# Patient Record
Sex: MALE | Race: WHITE | NOT HISPANIC OR LATINO | Employment: FULL TIME | URBAN - METROPOLITAN AREA
[De-identification: names, ages, dates, MRNs, and addresses within clinical notes are randomized per-mention and may not be internally consistent; named-entity substitution may affect disease eponyms.]

---

## 2019-07-03 ENCOUNTER — HOSPITAL ENCOUNTER (OUTPATIENT)
Dept: OTHER | Facility: HOSPITAL | Age: 54
Discharge: HOME | End: 2019-07-03
Attending: PSYCHIATRY & NEUROLOGY

## 2019-09-10 ENCOUNTER — HOSPITAL ENCOUNTER (OUTPATIENT)
Dept: OTHER | Facility: HOSPITAL | Age: 54
Discharge: HOME | End: 2019-09-10
Attending: PSYCHIATRY & NEUROLOGY

## 2019-09-10 ENCOUNTER — OFFICE VISIT CONVERTED (OUTPATIENT)
Dept: NEUROLOGY | Facility: CLINIC | Age: 54
End: 2019-09-10
Attending: PSYCHIATRY & NEUROLOGY

## 2019-09-10 LAB
ALBUMIN SERPL-MCNC: 4.2 G/DL (ref 3.5–5)
ALBUMIN/GLOB SERPL: 1.4 {RATIO} (ref 1.4–2.6)
ALP SERPL-CCNC: 60 U/L (ref 56–119)
ALT SERPL-CCNC: 7 U/L (ref 10–40)
ANION GAP SERPL CALC-SCNC: 13 MMOL/L (ref 8–19)
AST SERPL-CCNC: 27 U/L (ref 15–50)
BASOPHILS # BLD MANUAL: 0.03 10*3/UL (ref 0–0.2)
BASOPHILS NFR BLD MANUAL: 0.4 % (ref 0–3)
BILIRUB SERPL-MCNC: 0.58 MG/DL (ref 0.2–1.3)
BUN SERPL-MCNC: 14 MG/DL (ref 5–25)
BUN/CREAT SERPL: 12 {RATIO} (ref 6–20)
CALCIUM SERPL-MCNC: 9.2 MG/DL (ref 8.7–10.4)
CHLORIDE SERPL-SCNC: 103 MMOL/L (ref 99–111)
CONV CO2: 27 MMOL/L (ref 22–32)
CONV TOTAL PROTEIN: 7.2 G/DL (ref 6.3–8.2)
CREAT UR-MCNC: 1.19 MG/DL (ref 0.7–1.2)
DEPRECATED RDW RBC AUTO: 37.5 FL
EOSINOPHIL # BLD MANUAL: 0.31 10*3/UL (ref 0–0.7)
EOSINOPHIL NFR BLD MANUAL: 4.6 % (ref 0–7)
ERYTHROCYTE [DISTWIDTH] IN BLOOD BY AUTOMATED COUNT: 11.7 % (ref 11.5–14.5)
GFR SERPLBLD BASED ON 1.73 SQ M-ARVRAT: >60 ML/MIN/{1.73_M2}
GLOBULIN UR ELPH-MCNC: 3 G/DL (ref 2–3.5)
GLUCOSE SERPL-MCNC: 107 MG/DL (ref 70–99)
GRANS (ABSOLUTE): 4.18 10*3/UL (ref 2–8)
GRANS: 62.4 % (ref 30–85)
HBA1C MFR BLD: 16.3 G/DL (ref 14–18)
HCT VFR BLD AUTO: 48.9 % (ref 42–52)
IMM GRANULOCYTES # BLD: 0.01 10*3/UL (ref 0–0.54)
IMM GRANULOCYTES NFR BLD: 0.1 % (ref 0–0.43)
LYMPHOCYTES # BLD MANUAL: 1.65 10*3/UL (ref 1–5)
LYMPHOCYTES NFR BLD MANUAL: 7.9 % (ref 3–10)
MCH RBC QN AUTO: 29 PG (ref 27–31)
MCHC RBC AUTO-ENTMCNC: 33.3 G/DL (ref 33–37)
MCV RBC AUTO: 86.9 FL (ref 80–96)
MONOCYTES # BLD AUTO: 0.53 10*3/UL (ref 0.2–1.2)
OSMOLALITY SERPL CALC.SUM OF ELEC: 289 MOSM/KG (ref 273–304)
PLATELET # BLD AUTO: 207 10*3/UL (ref 130–400)
PMV BLD AUTO: 8.8 FL (ref 7.4–10.4)
POTASSIUM SERPL-SCNC: 4.3 MMOL/L (ref 3.5–5.3)
RBC # BLD AUTO: 5.63 10*6/UL (ref 4.7–6.1)
SODIUM SERPL-SCNC: 139 MMOL/L (ref 135–147)
VARIANT LYMPHS NFR BLD MANUAL: 24.6 % (ref 20–45)
WBC # BLD AUTO: 6.71 10*3/UL (ref 4.8–10.8)

## 2020-03-17 ENCOUNTER — OFFICE VISIT CONVERTED (OUTPATIENT)
Dept: NEUROLOGY | Facility: CLINIC | Age: 55
End: 2020-03-17
Attending: PSYCHIATRY & NEUROLOGY

## 2020-03-17 ENCOUNTER — CONVERSION ENCOUNTER (OUTPATIENT)
Dept: NEUROLOGY | Facility: CLINIC | Age: 55
End: 2020-03-17

## 2020-06-11 ENCOUNTER — OFFICE VISIT CONVERTED (OUTPATIENT)
Dept: NEUROLOGY | Facility: CLINIC | Age: 55
End: 2020-06-11
Attending: NURSE PRACTITIONER

## 2020-09-22 ENCOUNTER — OFFICE VISIT CONVERTED (OUTPATIENT)
Dept: NEUROLOGY | Facility: CLINIC | Age: 55
End: 2020-09-22
Attending: NURSE PRACTITIONER

## 2021-01-26 ENCOUNTER — OFFICE VISIT CONVERTED (OUTPATIENT)
Dept: NEUROLOGY | Facility: CLINIC | Age: 56
End: 2021-01-26
Attending: NURSE PRACTITIONER

## 2021-05-13 NOTE — PROGRESS NOTES
Progress Note      Patient Name: Lenny Sanders   Patient ID: [id number]   Sex: Male   YOB: 1965    Primary Care Provider: Braulio Ricks MD   Referring Provider: Braulio Ricks MD    Visit Date: June 11, 2020    Provider: SHUKRI Aparicio   Location: [facility]   Location Address: [address] [place] [address] [place], [place]  [id number]   Location Phone: [phone]          Chief Complaint  · Follow Up Exam      History Of Present Illness  Lenny Sanders is a 54 year old /White male who presents today to [facility] today for a follow up exam. Denies falls. Endorses tremor to left hand. Endorses mild constipation. Moods are good. Sleeping fairly well. Denies difficulty swallowing or getting choked. Denies hallucinations. Endorses occasional vivid dreams.       Past Medical History  Parkinson's Disease         Past Surgical History  Hernia         Medication List  carbidopa-levodopa  mg oral tablet; celecoxib 200 mg oral capsule; ibuprofen 200 mg oral capsule; pramipexole 0.25 mg oral tablet         Allergy List  NO KNOWN DRUG ALLERGIES       Allergies Reconciled  Social History  Alcohol (Never); Denies substance abuse (Never); Tobacco (Never)         Review of Systems  · Constitutional  o Denies  o : chills, excessive sweating, fatigue, fever, sycope/passing out, weight gain, weight loss  · Eyes  o Denies  o : changes in vision, blurry vision, double vision  · HENT  o Denies  o : loss of hearing, ringing in the ears, ear aches, sore throat, nasal congestion, sinus pain, nose bleeds, seasonal allergies  · Cardiovascular  o Denies  o : blood clots, swollen legs, anemia, easy burising or bleeding, transfusions  · Respiratory  o Denies  o : shortness of breath, dry cough, productive cough, pneumonia, COPD  · Gastrointestinal  o Denies  o : difficulty swallowing, reflux  · Genitourinary  o Denies  o :  "incontinence  · Neurologic  o Admits  o : tremor  o Denies  o : headache, seizure, stroke, loss of balance, falls, dizziness/vertigo, difficulty with sleep, numbness/tingling/paresthesia , difficulty with coordination, difficulty with dexterity, weakness  · Musculoskeletal  o Denies  o : neck stiffness/pain, swollen lymph nodes, muscle aches, joint pain, weakness, spasms, sciatica, pain radiating in arm, pain radiating in leg, low back pain  · Endocrine  o Denies  o : diabetes, thyroid disorder  · Psychiatric  o Denies  o : anxiety, depression      Vitals  Date Time BP Position Site L\R Cuff Size HR RR TEMP (F) WT  HT  BMI kg/m2 BSA m2 O2 Sat HC       06/11/2020 01:15 /86 Sitting    86 - R  97.8 241lbs 0oz 5'  10\" 34.58 2.32           Physical Examination  · Constitutional  o Appearance  o : well-nourished, well groomed, in no apparent distress  · Eyes  o Pupils and Irises  o : Pupils equal, round, and reactive to light and accommodation bilaterally  · Respiratory  o Auscultation of Lungs  o : Lungs were clear to ascultation bilaterally. No wheezes, rhonchi or rales were appreciated.  · Cardiovascular  o Heart  o :   § Auscultation of Heart  § : Regular rate and rhythm, no murmurs, gallops or rubs were appreciated.  o Peripheral Vascular System  o :   § Extremities  § : No peripheral edema was appreciated  · Musculoskeletal  o General  o : Normal bulk and normal tone throughout. 5/5 motor strength throughout and symmetric. Normal gait and station.  · Neurologic  o Mental Status Examination  o :   § Orientation  § : Alert and oriented to person, place, and time,  § Speech/Language  § : Intact naming, comprehension, and repetition. No dysarthria.  § Memory  § : Immediate recall is 3/3. Recall at 5 minutes is 3/3.   § Attention  § : Attention span is intact to serial 7 examination and finger tapping.   § Fund of Knowledge  § : Adequate fund of knowledge  o Cranial Nerves  o : Pupils are equal, round and reactive " "to light. Extraocular movements are intact. Visual fields are full. Fundoscopic examination reveals sharp disc bilaterally. Sensation in the V1-V3 distribution is intact and symmetric. Muscles of mastication are strong and symmetric. Muscles of facial expression are strong and symmetric. Hearing is intact. Palatal raise is intact and symmetric. Uvula is midline. Shoulder shrug is strong. Tongue protrudes in the midline.  o Motor Examination  o :   § RUE Strength  § : strength normal  § LUE Strength  § : strength normal  § RLE Strength  § : strength normal  § LLE Strength  § : strength normal  o Reflexes  o : 2+ reflexes throughout and symmetric. Negative Boyer. Negative Babinski.   o Sensation  o : Intact sensation to light touch, pinprick, vibration and proprioception throughout.  o Gait and Station  o :   § Gait Screening  § : Normal, narrow based gait, with a normal arm swing.  o Cerebellar Function  o : intact finger to nose and heel to shin. Rapid alternating movements are intact in the upper and lower extremities.   o Coordination  o : Intact finger to nose and heel to shin. Rapid alternating movements are intact in the upper and lower extremities.     Moderate left upper extremity tremor.  Mild bradykinesia to left upper extremity.  Moderate bradykinesia to LLE.  Mild rigidity.           Assessment  · Parkinson's Disease     332.0/G20  Appears \"off\" from a PD standpoint today. He feels he had better control of symptoms on higher doses of CD-LD. Pramipexole is causing drowsiness. Discussed that he can take pramipexole qHS only if needed. He will increase CD-LD to 1.5-2 tablets QID.     Problems Reconciled  Plan  · Medications  o Medications have been Reconciled  o Transition of Care or Provider Policy  · Instructions  o Call or Return if symptoms worsen or persist.   o Follow up in 3 months.             Electronically Signed by: SHUKRI Aparicio -Author on June 13, 2020 12:26:02 PM  "

## 2021-05-13 NOTE — PROGRESS NOTES
Progress Note      Patient Name: Lenny Sanders   Patient ID: [id number]   Sex: Male   YOB: 1965    Primary Care Provider: Braulio Ricks MD   Referring Provider: Braulio Ricsk MD    Visit Date: September 22, 2020    Provider: SHUKRI Aparicio   Location: [facility]   Location Address: [address] [place] [address] [place], [place]  [id number]   Location Phone: [phone]          Chief Complaint     3 month follow up.       History Of Present Illness  Lenny Sanders is a 54 year old /White male who presents today to [facility] today for a follow up exam. Denies falls. Endorses tremor to left hand. Endorses mild constipation. Moods are good. Sleeping fairly well. Denies difficulty swallowing or getting choked. Denies hallucinations. Endorses occasional vivid dreams. Is taking CD-LD 1.5 tablets QID as directed and feels this is working much better for him.       Past Medical History  Parkinson's Disease         Past Surgical History  Hernia         Medication List  carbidopa-levodopa  mg oral tablet; celecoxib 200 mg oral capsule; ibuprofen 200 mg oral capsule         Allergy List  NO KNOWN DRUG ALLERGIES         Social History  Alcohol (Never); Denies substance abuse (Never); Tobacco (Never)         Review of Systems  · Constitutional  o Denies  o : chills, excessive sweating, fatigue, fever, sycope/passing out, weight gain, weight loss  · Eyes  o Denies  o : changes in vision, blurry vision, double vision  · HENT  o Denies  o : loss of hearing, ringing in the ears, ear aches, sore throat, nasal congestion, sinus pain, nose bleeds, seasonal allergies  · Cardiovascular  o Denies  o : blood clots, swollen legs, anemia, easy burising or bleeding, transfusions  · Respiratory  o Denies  o : shortness of breath, dry cough, productive cough, pneumonia, COPD  · Gastrointestinal  o Denies  o : difficulty swallowing,  "reflux  · Genitourinary  o Denies  o : incontinence  · Neurologic  o Admits  o : tremor, numbness/tingling/paresthesia   o Denies  o : headache, seizure, stroke, loss of balance, falls, dizziness/vertigo, difficulty with sleep, difficulty with coordination, difficulty with dexterity, weakness  · Musculoskeletal  o Admits  o : muscle aches, joint pain  o Denies  o : neck stiffness/pain, swollen lymph nodes, weakness, spasms, sciatica, pain radiating in arm, pain radiating in leg, low back pain  · Endocrine  o Denies  o : diabetes, thyroid disorder  · Psychiatric  o Denies  o : anxiety, depression      Vitals  Date Time BP Position Site L\R Cuff Size HR RR TEMP (F) WT  HT  BMI kg/m2 BSA m2 O2 Sat HC       09/22/2020 03:37 /99 Sitting    86 - R  97.6 246lbs 0oz 5'  10\" 35.3 2.35           Physical Examination  · Constitutional  o Appearance  o : well-nourished, well groomed, in no apparent distress  · Eyes  o Pupils and Irises  o : Pupils equal, round, and reactive to light and accommodation bilaterally  · Respiratory  o Auscultation of Lungs  o : Lungs were clear to ascultation bilaterally. No wheezes, rhonchi or rales were appreciated.  · Cardiovascular  o Heart  o :   § Auscultation of Heart  § : Regular rate and rhythm, no murmurs, gallops or rubs were appreciated.  o Peripheral Vascular System  o :   § Extremities  § : No peripheral edema was appreciated  · Musculoskeletal  o General  o : Normal bulk and normal tone throughout. 5/5 motor strength throughout and symmetric. Normal gait and station.  · Neurologic  o Mental Status Examination  o :   § Orientation  § : Alert and oriented to person, place, and time,  § Speech/Language  § : Intact naming, comprehension, and repetition. No dysarthria.  § Memory  § : Immediate recall is 3/3. Recall at 5 minutes is 3/3.   § Attention  § : Attention span is intact to serial 7 examination and finger tapping.   § Fund of Knowledge  § : Adequate fund of " knowledge  o Cranial Nerves  o : Pupils are equal, round and reactive to light. Extraocular movements are intact. Visual fields are full. Fundoscopic examination reveals sharp disc bilaterally. Sensation in the V1-V3 distribution is intact and symmetric. Muscles of mastication are strong and symmetric. Muscles of facial expression are strong and symmetric. Hearing is intact. Palatal raise is intact and symmetric. Uvula is midline. Shoulder shrug is strong. Tongue protrudes in the midline.  o Motor Examination  o :   § RUE Strength  § : strength normal  § LUE Strength  § : strength normal  § RLE Strength  § : strength normal  § LLE Strength  § : strength normal  o Reflexes  o : 2+ reflexes throughout and symmetric. Negative Boyer. Negative Babinski.   o Sensation  o : Intact sensation to light touch, pinprick, vibration and proprioception throughout.  o Gait and Station  o :   § Gait Screening  § : Normal, narrow based gait, with a normal arm swing.  o Cerebellar Function  o : intact finger to nose and heel to shin. Rapid alternating movements are intact in the upper and lower extremities.   o Coordination  o : Intact finger to nose and heel to shin. Rapid alternating movements are intact in the upper and lower extremities.     Mild left upper extremity tremor.  Mild bradykinesia to left upper extremity.  Mild bradykinesia to LLE.  Mild rigidity.           Assessment  · Parkinson's Disease     332.0/G20  Looks good from a PD standpoint today. Will continue CD-LD 1.5 tablets QID.      Plan  · Medications  o carbidopa-levodopa  mg oral tablet   SI.5 tablets QID   DISP: (240) Tablet with 1 refills  Adjusted on 2020     o Medications have been Reconciled  o Transition of Care or Provider Policy  · Instructions  o f/u 4 months  · Disposition  o Call or Return if symptoms worsen or persist.            Electronically Signed by: Tsering Wade APRN -Author on 2020 03:46:29 PM

## 2021-05-14 VITALS
HEART RATE: 86 BPM | SYSTOLIC BLOOD PRESSURE: 150 MMHG | BODY MASS INDEX: 35.22 KG/M2 | WEIGHT: 246 LBS | TEMPERATURE: 97.6 F | HEIGHT: 70 IN | DIASTOLIC BLOOD PRESSURE: 99 MMHG

## 2021-05-14 VITALS
HEART RATE: 78 BPM | WEIGHT: 245 LBS | BODY MASS INDEX: 35.07 KG/M2 | DIASTOLIC BLOOD PRESSURE: 89 MMHG | HEIGHT: 70 IN | SYSTOLIC BLOOD PRESSURE: 142 MMHG

## 2021-05-14 NOTE — PROGRESS NOTES
Progress Note      Patient Name: Lenny Sanders   Patient ID: [id number]   Sex: Male   YOB: 1965    Primary Care Provider: Braulio Ricks MD   Referring Provider: Braulio Ricks MD    Visit Date: January 26, 2021    Provider: SHUKRI Aparicio   Location: [facility]   Location Address: [address] [place] [address] [place], [place]  [id number]   Location Phone: [phone]          Chief Complaint     Pt is here for a 3 month f/u       History Of Present Illness  Lenny Sanders is a 54 year old /White male who presents today to [facility] today for a follow up exam. Denies falls. Endorses tremor to left hand. Endorses mild constipation. Moods are good. Sleeping fairly well. Denies difficulty swallowing or getting choked. Denies hallucinations. Endorses occasional vivid dreams. Is taking CD-LD 1.5 tablets QID as directed and feels this is working much better for him.       Past Medical History  Parkinson's Disease         Past Surgical History  Hernia         Medication List  carbidopa-levodopa  mg oral tablet; celecoxib 200 mg oral capsule; ibuprofen 200 mg oral capsule         Allergy List  NO KNOWN DRUG ALLERGIES       Allergies Reconciled  Social History  Alcohol (Never); Denies substance abuse (Never); Tobacco (Never)         Review of Systems  · Constitutional  o Denies  o : chills, excessive sweating, fatigue, fever, sycope/passing out, weight gain, weight loss  · Eyes  o Denies  o : changes in vision, blurry vision, double vision  · HENT  o Denies  o : loss of hearing, ringing in the ears, ear aches, sore throat, nasal congestion, sinus pain, nose bleeds, seasonal allergies  · Cardiovascular  o Denies  o : blood clots, swollen legs, anemia, easy burising or bleeding, transfusions  · Respiratory  o Denies  o : shortness of breath, dry cough, productive cough, pneumonia, COPD  · Gastrointestinal  o Denies  o : difficulty swallowing,  "reflux  · Genitourinary  o Denies  o : incontinence  · Neurologic  o Admits  o : tremor, numbness/tingling/paresthesia   o Denies  o : headache, seizure, stroke, loss of balance, falls, dizziness/vertigo, difficulty with sleep, difficulty with coordination, difficulty with dexterity, weakness  · Musculoskeletal  o Admits  o : muscle aches, joint pain  o Denies  o : neck stiffness/pain, swollen lymph nodes, weakness, spasms, sciatica, pain radiating in arm, pain radiating in leg, low back pain  · Endocrine  o Denies  o : diabetes, thyroid disorder  · Psychiatric  o Denies  o : anxiety, depression      Vitals  Date Time BP Position Site L\R Cuff Size HR RR TEMP (F) WT  HT  BMI kg/m2 BSA m2 O2 Sat FR L/min FiO2 HC       01/26/2021 03:32 /89 Sitting    78 - R   245lbs 0oz 5'  10\" 35.15 2.34             Physical Examination  · Constitutional  o Appearance  o : well-nourished, well groomed, in no apparent distress  · Eyes  o Pupils and Irises  o : Pupils equal, round, and reactive to light and accommodation bilaterally  · Respiratory  o Auscultation of Lungs  o : Lungs were clear to ascultation bilaterally. No wheezes, rhonchi or rales were appreciated.  · Cardiovascular  o Heart  o :   § Auscultation of Heart  § : Regular rate and rhythm, no murmurs, gallops or rubs were appreciated.  o Peripheral Vascular System  o :   § Extremities  § : No peripheral edema was appreciated  · Musculoskeletal  o General  o : Normal bulk and normal tone throughout. 5/5 motor strength throughout and symmetric. Normal gait and station.  · Neurologic  o Mental Status Examination  o :   § Orientation  § : Alert and oriented to person, place, and time,  § Speech/Language  § : Intact naming, comprehension, and repetition. No dysarthria.  § Memory  § : Immediate recall is 3/3. Recall at 5 minutes is 3/3.   § Attention  § : Attention span is intact to serial 7 examination and finger tapping.   § Fund of Knowledge  § : Adequate fund of " knowledge  o Cranial Nerves  o : Pupils are equal, round and reactive to light. Extraocular movements are intact. Visual fields are full. Fundoscopic examination reveals sharp disc bilaterally. Sensation in the V1-V3 distribution is intact and symmetric. Muscles of mastication are strong and symmetric. Muscles of facial expression are strong and symmetric. Hearing is intact. Palatal raise is intact and symmetric. Uvula is midline. Shoulder shrug is strong. Tongue protrudes in the midline.  o Motor Examination  o :   § RUE Strength  § : strength normal  § LUE Strength  § : strength normal  § RLE Strength  § : strength normal  § LLE Strength  § : strength normal  o Reflexes  o : 2+ reflexes throughout and symmetric. Negative Boyer. Negative Babinski.   o Sensation  o : Intact sensation to light touch, pinprick, vibration and proprioception throughout.  o Gait and Station  o :   § Gait Screening  § : Normal, narrow based gait, with a normal arm swing.  o Cerebellar Function  o : intact finger to nose and heel to shin. Rapid alternating movements are intact in the upper and lower extremities.   o Coordination  o : Intact finger to nose and heel to shin. Rapid alternating movements are intact in the upper and lower extremities.     Mild left upper extremity tremor.  Mild bradykinesia to left upper extremity.  Mild bradykinesia to LLE.  Mild rigidity.           Assessment  · Parkinson's Disease     332.0/G20  Looks good from a PD standpoint today. Will continue CD-LD 1.5 tablets QID.      Plan  · Medications  o carbidopa-levodopa  mg oral tablet   SI.5 tablets QID   DISP: (240) Tablet with 1 refills  Refilled on 2021     o Medications have been Reconciled  o Transition of Care or Provider Policy  · Disposition  o Call or Return if symptoms worsen or persist.            Electronically Signed by: SHUKRI Aparicio -Author on 2021 08:07:56 PM

## 2021-05-15 VITALS
HEART RATE: 82 BPM | WEIGHT: 245 LBS | HEIGHT: 70 IN | SYSTOLIC BLOOD PRESSURE: 137 MMHG | BODY MASS INDEX: 35.07 KG/M2 | DIASTOLIC BLOOD PRESSURE: 75 MMHG

## 2021-05-15 VITALS
BODY MASS INDEX: 34.5 KG/M2 | HEIGHT: 70 IN | HEART RATE: 86 BPM | TEMPERATURE: 97.8 F | DIASTOLIC BLOOD PRESSURE: 86 MMHG | WEIGHT: 241 LBS | SYSTOLIC BLOOD PRESSURE: 135 MMHG

## 2021-05-15 VITALS — WEIGHT: 238 LBS | HEART RATE: 69 BPM | SYSTOLIC BLOOD PRESSURE: 121 MMHG | DIASTOLIC BLOOD PRESSURE: 75 MMHG

## 2021-06-01 ENCOUNTER — OFFICE VISIT CONVERTED (OUTPATIENT)
Dept: NEUROLOGY | Facility: CLINIC | Age: 56
End: 2021-06-01
Attending: NURSE PRACTITIONER

## 2021-06-06 NOTE — PROGRESS NOTES
Progress Note      Patient Name: Lenny Sanders   Patient ID: [id number]   Sex: Male   YOB: 1965    Primary Care Provider: Braulio Ricks MD   Referring Provider: Braulio Ricks MD    Visit Date: June 1, 2021    Provider: SHUKRI Aparicio   [facility]: [facility]   Location Address: [address] [place] [address] [place], [place]  [id number]   Location Phone: [phone]          Chief Complaint     3 month f/u parkinsons       History Of Present Illness  Lenny Sanders is a 54 year old /White male who presents today to [facility] today for a follow up exam. Denies falls. Endorses tremor to left hand. Endorses mild constipation. Moods are good. Sleeping fairly well. Denies difficulty swallowing or getting choked. Denies hallucinations. Endorses occasional vivid dreams. Is taking CD-LD 1.5 tablets QID as directed and feels this is working very well for him.       Past Medical History  Parkinson's Disease         Past Surgical History  Hernia         Medication List  carbidopa-levodopa  mg oral tablet; celecoxib 200 mg oral capsule; ibuprofen 200 mg oral capsule         Allergy List  NO KNOWN DRUG ALLERGIES         Social History  Alcohol (Never); Denies substance abuse (Never); Tobacco (Never)         Review of Systems  · Constitutional  o Denies  o : chills, excessive sweating, fatigue, fever, sycope/passing out, weight gain, weight loss  · Eyes  o Denies  o : changes in vision, blurry vision, double vision  · HENT  o Denies  o : loss of hearing, ringing in the ears, ear aches, sore throat, nasal congestion, sinus pain, nose bleeds, seasonal allergies  · Cardiovascular  o Denies  o : blood clots, swollen legs, anemia, easy burising or bleeding, transfusions  · Respiratory  o Denies  o : shortness of breath, dry cough, productive cough, pneumonia, COPD  · Gastrointestinal  o Denies  o : difficulty swallowing,  "reflux  · Genitourinary  o Denies  o : incontinence  · Neurologic  o Admits  o : tremor, numbness/tingling/paresthesia   o Denies  o : headache, seizure, stroke, loss of balance, falls, dizziness/vertigo, difficulty with sleep, difficulty with coordination, difficulty with dexterity, weakness  · Musculoskeletal  o Admits  o : muscle aches, joint pain  o Denies  o : neck stiffness/pain, swollen lymph nodes, weakness, spasms, sciatica, pain radiating in arm, pain radiating in leg, low back pain  · Endocrine  o Denies  o : diabetes, thyroid disorder  · Psychiatric  o Denies  o : anxiety, depression      Vitals  Date Time BP Position Site L\R Cuff Size HR RR TEMP (F) WT  HT  BMI kg/m2 BSA m2 O2 Sat FR L/min FiO2 HC       06/01/2021 02:43 /83 Sitting    81 - R   238lbs 0oz 5'  10\" 34.15 2.31             Physical Examination  · Constitutional  o Appearance  o : well-nourished, well groomed, in no apparent distress  · Eyes  o Pupils and Irises  o : Pupils equal, round, and reactive to light and accommodation bilaterally  · Respiratory  o Auscultation of Lungs  o : Lungs were clear to ascultation bilaterally. No wheezes, rhonchi or rales were appreciated.  · Cardiovascular  o Heart  o :   § Auscultation of Heart  § : Regular rate and rhythm, no murmurs, gallops or rubs were appreciated.  o Peripheral Vascular System  o :   § Extremities  § : No peripheral edema was appreciated  · Musculoskeletal  o General  o : Normal bulk and normal tone throughout. 5/5 motor strength throughout and symmetric. Normal gait and station.  · Neurologic  o Mental Status Examination  o :   § Orientation  § : Alert and oriented to person, place, and time,  § Speech/Language  § : Intact naming, comprehension, and repetition. No dysarthria.  § Memory  § : Immediate recall is 3/3. Recall at 5 minutes is 3/3.   § Attention  § : Attention span is intact to serial 7 examination and finger tapping.   § Fund of Knowledge  § : Adequate fund of " knowledge  o Cranial Nerves  o : Pupils are equal, round and reactive to light. Extraocular movements are intact. Visual fields are full. Fundoscopic examination reveals sharp disc bilaterally. Sensation in the V1-V3 distribution is intact and symmetric. Muscles of mastication are strong and symmetric. Muscles of facial expression are strong and symmetric. Hearing is intact. Palatal raise is intact and symmetric. Uvula is midline. Shoulder shrug is strong. Tongue protrudes in the midline.  o Motor Examination  o :   § RUE Strength  § : strength normal  § LUE Strength  § : strength normal  § RLE Strength  § : strength normal  § LLE Strength  § : strength normal  o Reflexes  o : 2+ reflexes throughout and symmetric. Negative Boyer. Negative Babinski.   o Sensation  o : Intact sensation to light touch, pinprick, vibration and proprioception throughout.  o Gait and Station  o :   § Gait Screening  § : Normal, narrow based gait, with a normal arm swing.  o Cerebellar Function  o : intact finger to nose and heel to shin. Rapid alternating movements are intact in the upper and lower extremities.   o Coordination  o : Intact finger to nose and heel to shin. Rapid alternating movements are intact in the upper and lower extremities.     Mild left upper extremity tremor.  Mild bradykinesia to left upper extremity.  Mild bradykinesia to LLE.  Mild rigidity.           Assessment  · Parkinson's Disease     332.0/G20  Looks good from a PD standpoint today. Will continue CD-LD 1.5 tablets QID.      Plan  · Medications  o carbidopa-levodopa  mg oral tablet   SI.5 tablets QID   DISP: (240) Tablet with 1 refills  Refilled on 2021     o Medications have been Reconciled  o Transition of Care or Provider Policy  · Instructions  o Encouraged to follow-up with Primary Care Provider for preventative care.  o f/u 6 months  · Disposition  o Call or Return if symptoms worsen or persist.            Electronically Signed by:  Tsering Wade, APRN -Author on June 1, 2021 02:57:28 PM

## 2021-07-15 VITALS
DIASTOLIC BLOOD PRESSURE: 83 MMHG | SYSTOLIC BLOOD PRESSURE: 147 MMHG | HEIGHT: 70 IN | BODY MASS INDEX: 34.07 KG/M2 | HEART RATE: 81 BPM | WEIGHT: 238 LBS

## 2021-12-14 ENCOUNTER — OFFICE VISIT (OUTPATIENT)
Dept: NEUROLOGY | Facility: CLINIC | Age: 56
End: 2021-12-14

## 2021-12-14 VITALS
HEIGHT: 70 IN | HEART RATE: 76 BPM | WEIGHT: 235.2 LBS | DIASTOLIC BLOOD PRESSURE: 81 MMHG | SYSTOLIC BLOOD PRESSURE: 147 MMHG | BODY MASS INDEX: 33.67 KG/M2

## 2021-12-14 DIAGNOSIS — G20 PARKINSON'S DISEASE (HCC): Primary | ICD-10-CM

## 2021-12-14 PROBLEM — G20.A1 PARKINSON'S DISEASE: Status: ACTIVE | Noted: 2021-12-14

## 2021-12-14 PROCEDURE — 99213 OFFICE O/P EST LOW 20 MIN: CPT | Performed by: NURSE PRACTITIONER

## 2021-12-14 RX ORDER — CELECOXIB 200 MG/1
1 CAPSULE ORAL DAILY
COMMUNITY
Start: 2021-11-11 | End: 2022-12-27

## 2021-12-14 NOTE — PATIENT INSTRUCTIONS
Parkinson's Disease  Parkinson's disease is a type of movement disorder. It is a long-term condition that gets worse over time (is progressive). Each person with Parkinson's disease is affected differently.  This condition limits your ability to control movements and move your body normally. The condition can range from mild to severe. Parkinson's disease tends to get worse slowly over several years.  What are the causes?  Parkinson's disease results from a loss of brain cells (neurons) that make a brain chemical called dopamine. Dopamine is needed to control movement. As the condition gets worse, neurons make less dopamine. This makes it hard to move or control your movements.  The exact cause of the loss of neurons and why they make less dopamine is not known. Factors related to genes and the environment may contribute to the cause of Parkinson's disease.  What increases the risk?  The following factors may make you more likely to develop this condition:  · Being male.  · Being age 60 or older.  · Having a family history of Parkinson's disease.  · Having had a traumatic brain injury.  · Having experienced depression.  · Having been exposed to toxins, such as pesticides.  What are the signs or symptoms?  Symptoms of this condition can vary. The main symptoms are related to movement. These include:  · A tremor or shaking while you are resting that you cannot control.  · Stiffness in your neck, arms, and legs (rigidity).  · Slowing of movement. You may lose facial expressions and have trouble making small movements that are needed to button clothing or brush your teeth.  · An abnormal walk. You may walk with short, shuffling steps.  · Loss of balance and stability when standing. You may sway, fall backward, and have trouble making turns.  Other symptoms include:  · Mental or cognitive changes including depression, anxiety, having false beliefs (delusions), or seeing, hearing, or feeling things that do not exist  (hallucinations).  · Trouble speaking or swallowing.  · Changes in bowel or bladder functions including constipation, having to go urgently or frequently, or not being able to control your bowel or bladder.  · Changes in sleep habits or trouble sleeping.  Parkinson's disease may be graded by severity of your condition as mild, moderate, or advanced. Parkinson's disease progression is different for everyone. You may not progress to the advanced stage.  · Mild Parkinson's disease involves:  ? Movement problems that do not affect daily activities.  ? Movement problems on one side of the body.  · Moderate Parkinson's disease involves:  ? Movement problems on both sides of the body.  ? Slowing of movement.  ? Coordination and balance problems.  · Advanced Parkinson's disease involves:  ? Extreme difficulty walking.  ? Inability to live alone safely.  ? Signs of dementia, such as having trouble remembering things, doing daily tasks such as getting dressed, and problem solving.  How is this diagnosed?  This condition is diagnosed by a specialist. A diagnosis may be made based on symptoms, your medical history, and a physical exam. You may also have brain imaging tests to check for a loss of dopamine-producing areas of the brain.  How is this treated?  There is no cure for Parkinson's disease. Treatment focuses on managing your symptoms. Treatment may include:  · Medicines. Everyone responds to medicines differently. Your response may change over time. Work with your health care provider to find the best medicines for you.  · Speech, occupational, and physical therapy.  · Deep brain stimulation surgery to reduce tremors and other involuntary movements.  Follow these instructions at home:  Medicines  · Take over-the-counter and prescription medicines only as told by your health care provider.  · Avoid taking medicines that can affect thinking, such as pain or sleeping medicines.  Eating and drinking  · Follow instructions  from your health care provider about eating or drinking restrictions.  · Do not drink alcohol.  Activity  · Talk with your health care provider about if it is safe for you to drive.  · Do exercises as told by your health care provider or physical therapist.  Lifestyle         · Install grab bars and railings in your home to prevent falls.  · Do not use any products that contain nicotine or tobacco, such as cigarettes, e-cigarettes, and chewing tobacco. If you need help quitting, ask your health care provider.  · Consider joining a support group for people with Parkinson's disease.  General instructions  · Work with your health care provider to determine what you need help with and what your safety needs are.  · Keep all follow-up visits as told by your health care provider, including any visits with a physical therapist, speech therapist, or occupational therapist. This is important.  Contact a health care provider if:  · Medicines do not help your symptoms.  · You are unsteady or have fallen at home.  · You need more support to function well at home.  · You have trouble swallowing.  · You have severe constipation.  · You are having problems with side effects from your medicines.  · You feel confused, anxious, or depressed.  Get help right away if you:  · Are injured after a fall.  · See or hear things that are not real.  · Cannot swallow without choking.  · Have chest pain or trouble breathing.  · Do not feel safe at home.  · Have thoughts about hurting yourself or others.  If you ever feel like you may hurt yourself or others, or have thoughts about taking your own life, get help right away. You can go to your nearest emergency department or call:  · Your local emergency services (911 in the [place]  · A suicide crisis helpline, such as the National Suicide Prevention Lifeline at 1-914.976.5135. This is open 24 hours a day.  Summary  · Parkinson's disease is a long-term condition that gets worse over time. This  condition limits your ability to control your movements and move your body normally.  · There is no cure for Parkinson's disease. Treatment focuses on managing your symptoms.  · Work with your health care provider to determine what you need help with and what your safety needs are.  · Keep all follow-up visits as told by your health care provider, including any visits with a physical therapist, speech therapist, or occupational therapist. This is important.  This information is not intended to replace advice given to you by your health care provider. Make sure you discuss any questions you have with your health care provider.  Document Revised: 03/05/2020 Document Reviewed: 03/05/2020  Elsevier Patient Education © 2021 [employer]

## 2021-12-14 NOTE — PROGRESS NOTES
"Chief Complaint  Parkinson's Disease    Subjective          Lenny Sanders presents to [facility]OLOG[facility]UROSURGERY  Denies falls. Endorses tremor to left hand. Endorses mild constipation. Moods are good. Sleeping fairly well. Denies difficulty swallowing or getting choked. Denies hallucinations. Endorses occasional vivid dreams. Is taking CD-LD 2 tablets QID as directed and feels this is working very well for him.      Objective   Vital Signs:   /81   Pulse 76   Ht 177.8 cm (70\")   Wt 107 kg (235 lb 3.2 oz)   BMI 33.75 kg/m²     Physical Exam  Neurological:      Mental Status: He is oriented to person, place, and time.        Neurologic Exam     Mental Status   Oriented to person, place, and time.     Cranial Nerves   Cranial nerves II through XII intact.     Gait, Coordination, and Reflexes Mild resting tremor noted to right hand.  Mild bradykinesia to right upper and lower extremity.  Normal gait.  Decreased arm swing on the right.         Result Review :               Assessment and Plan    Diagnoses and all orders for this visit:    1. Parkinson's disease (HCC) (Primary)  Assessment & Plan:  Continue CD-LD 2 tablets QID      Other orders  -     carbidopa-levodopa (SINEMET)  MG per tablet; Take 2 tablets by mouth 4 (Four) Times a Day for 90 days.  Dispense: 720 tablet; Refill: 1      Follow Up   Return in about 6 months (around 6/14/2022) for parkinson's f/u.  Patient was given instructions and counseling regarding his condition or for health maintenance advice. Please see specific information pulled into the AVS if appropriate.       "

## 2022-06-14 ENCOUNTER — OFFICE VISIT (OUTPATIENT)
Dept: NEUROLOGY | Facility: CLINIC | Age: 57
End: 2022-06-14

## 2022-06-14 VITALS
HEIGHT: 70 IN | WEIGHT: 231 LBS | SYSTOLIC BLOOD PRESSURE: 140 MMHG | BODY MASS INDEX: 33.07 KG/M2 | HEART RATE: 86 BPM | DIASTOLIC BLOOD PRESSURE: 82 MMHG

## 2022-06-14 DIAGNOSIS — G20 PARKINSON'S DISEASE: Primary | ICD-10-CM

## 2022-06-14 PROCEDURE — 99213 OFFICE O/P EST LOW 20 MIN: CPT | Performed by: NURSE PRACTITIONER

## 2022-06-14 NOTE — PROGRESS NOTES
"Chief Complaint  Parkinson's Disease    Subjective          Lenny Sanders presents to [facility] NEUROLOGY & NEUROSURGERY  Denies falls. Endorses mild tremor to left hand. Denies constipation. Moods are good. Sleeping fairly well. Denies difficulty swallowing or getting choked. Denies hallucinations. Endorses occasional vivid dreams. Is taking CD-LD 2 tablets QID as directed and feels this is working very well for him.      Objective   Vital Signs:   /82   Pulse 86   Ht 177.8 cm (70\")   Wt 105 kg (231 lb)   BMI 33.15 kg/m²     Physical Exam  Neurological:      Mental Status: He is oriented to person, place, and time.        Neurologic Exam     Mental Status   Oriented to person, place, and time.     Cranial Nerves   Cranial nerves II through XII intact.     Gait, Coordination, and Reflexes Mild resting tremor noted to right hand.  Mild bradykinesia to right upper and lower extremity.  Normal gait.  Decreased arm swing on the right.         Result Review :               Assessment and Plan {CC Problem List  Visit Diagnosis   ROS  Review (Popup)  Health Maintenance  Quality  BestPractice  Medications  SmartSets  SnapShot Encounters  Media :23}   Diagnoses and all orders for this visit:    1. Parkinson's disease (HCC) (Primary)  Assessment & Plan:  Continue CD-LD 2 tablets QID      Other orders  -     carbidopa-levodopa (SINEMET)  MG per tablet; Take 2 tablets by mouth 4 (Four) Times a Day for 90 days.  Dispense: 720 tablet; Refill: 1      Follow Up   Return in about 6 months (around 12/14/2022) for parkinson's f/u.  Patient was given instructions and counseling regarding his condition or for health maintenance advice. Please see specific information pulled into the AVS if appropriate.       "

## 2022-12-27 ENCOUNTER — OFFICE VISIT (OUTPATIENT)
Dept: NEUROLOGY | Facility: CLINIC | Age: 57
End: 2022-12-27

## 2022-12-27 VITALS
SYSTOLIC BLOOD PRESSURE: 146 MMHG | DIASTOLIC BLOOD PRESSURE: 93 MMHG | BODY MASS INDEX: 34.57 KG/M2 | WEIGHT: 241.5 LBS | HEIGHT: 70 IN | HEART RATE: 90 BPM

## 2022-12-27 DIAGNOSIS — G20 PARKINSON'S DISEASE: Primary | ICD-10-CM

## 2022-12-27 PROCEDURE — 99213 OFFICE O/P EST LOW 20 MIN: CPT | Performed by: NURSE PRACTITIONER

## 2022-12-27 RX ORDER — MELOXICAM 7.5 MG/1
TABLET ORAL TAKE AS DIRECTED
COMMUNITY
Start: 2022-10-25

## 2023-10-31 ENCOUNTER — OFFICE VISIT (OUTPATIENT)
Dept: NEUROLOGY | Facility: CLINIC | Age: 58
End: 2023-10-31
Payer: COMMERCIAL

## 2023-10-31 VITALS
HEIGHT: 70 IN | HEART RATE: 81 BPM | SYSTOLIC BLOOD PRESSURE: 169 MMHG | WEIGHT: 233.7 LBS | BODY MASS INDEX: 33.46 KG/M2 | DIASTOLIC BLOOD PRESSURE: 89 MMHG

## 2023-10-31 DIAGNOSIS — G20.A2 PARKINSON'S DISEASE WITHOUT DYSKINESIA, WITH FLUCTUATING MANIFESTATIONS: Primary | ICD-10-CM

## 2023-10-31 PROCEDURE — 99214 OFFICE O/P EST MOD 30 MIN: CPT | Performed by: NURSE PRACTITIONER

## 2023-10-31 NOTE — PROGRESS NOTES
"Chief Complaint  Parkinson's Disease    Subjective          Lenny Sanders presents to [facility] NEUROLOGY & NEUROSURGERY  History of Present Illness  Denies falls. Endorses mild tremor to left hand. Denies constipation. Moods are good. Sleeping fairly well. Denies difficulty swallowing or getting choked. Denies hallucinations. Endorses occasional vivid dreams. Is taking CD-LD 2 tablets QID as directed.  Does feel he's experiencing some wearing off.       Objective   Vital Signs:   /89   Pulse 81   Ht 177.8 cm (70\")   Wt 106 kg (233 lb 11.2 oz)   BMI 33.53 kg/m²     Physical Exam  Neurological:      Mental Status: He is oriented to person, place, and time.      Cranial Nerves: Cranial nerves 2-12 are intact.        Neurologic Exam     Mental Status   Oriented to person, place, and time.     Cranial Nerves   Cranial nerves II through XII intact.     Gait, Coordination, and Reflexes Mild resting tremor noted to right hand.  Mild bradykinesia to right upper and lower extremity.  Normal gait.  Decreased arm swing on the right.         Result Review :               Assessment and Plan    Diagnoses and all orders for this visit:    1. Parkinson's disease without dyskinesia, with fluctuating manifestations (Primary)  Assessment & Plan:  Increase CD-LD to 2.5 tablets QID due to wearing off.       Other orders  -     carbidopa-levodopa (SINEMET)  MG per tablet; Take 2.5 tablets by mouth 4 (Four) Times a Day for 30 days.  Dispense: 300 tablet; Refill: 5        Follow Up   Return in about 6 months (around 4/30/2024) for parkinson's f/u.  Patient was given instructions and counseling regarding his condition or for health maintenance advice. Please see specific information pulled into the AVS if appropriate.       "

## 2024-04-16 ENCOUNTER — OFFICE VISIT (OUTPATIENT)
Dept: NEUROLOGY | Facility: CLINIC | Age: 59
End: 2024-04-16
Payer: COMMERCIAL

## 2024-04-16 VITALS
HEIGHT: 70 IN | WEIGHT: 233 LBS | BODY MASS INDEX: 33.36 KG/M2 | DIASTOLIC BLOOD PRESSURE: 87 MMHG | HEART RATE: 91 BPM | SYSTOLIC BLOOD PRESSURE: 145 MMHG

## 2024-04-16 DIAGNOSIS — G20.A2 PARKINSON'S DISEASE WITHOUT DYSKINESIA, WITH FLUCTUATING MANIFESTATIONS: Primary | ICD-10-CM

## 2024-04-16 NOTE — PROGRESS NOTES
"Chief Complaint  Parkinson's Disease (Follow-up /)    Subjective          Lenny Sanders presents to [facility] NEUROLOGY & NEUROSURGERY  History of Present Illness  Denies falls. Endorses mild tremor to left hand. Denies constipation. Moods are good. Sleeping fairly well. Denies difficulty swallowing or getting choked. Denies hallucinations. Endorses occasional vivid dreams. Is taking CD-LD 2.5 tablets QID as directed.  Does feel he's experiencing some wearing off.       Objective   Vital Signs:   /87 (BP Location: Right arm, Patient Position: Sitting, Cuff Size: Large Adult)   Pulse 91   Ht 177.8 cm (70\")   Wt 106 kg (233 lb)   BMI 33.43 kg/m²     Physical Exam  Neurological:      Mental Status: He is oriented to person, place, and time.      Cranial Nerves: Cranial nerves 2-12 are intact.        Neurologic Exam     Mental Status   Oriented to person, place, and time.     Cranial Nerves   Cranial nerves II through XII intact.     Gait, Coordination, and Reflexes Mild resting tremor noted to right hand.  Mild bradykinesia to right upper and lower extremity.  Normal gait.  Decreased arm swing on the right.         Result Review :               Assessment and Plan    Diagnoses and all orders for this visit:    1. Parkinson's disease without dyskinesia, with fluctuating manifestations (Primary)  Assessment & Plan:  Continue CD-LD 2.5 tablets QID.       Other orders  -     carbidopa-levodopa (SINEMET)  MG per tablet; Take 2.5 tablets by mouth 4 (Four) Times a Day for 30 days.  Dispense: 300 tablet; Refill: 5        Follow Up   Return in about 6 months (around 10/16/2024) for parkinson's f/u.  Patient was given instructions and counseling regarding his condition or for health maintenance advice. Please see specific information pulled into the AVS if appropriate.       "

## 2024-10-15 ENCOUNTER — OFFICE VISIT (OUTPATIENT)
Dept: NEUROLOGY | Facility: CLINIC | Age: 59
End: 2024-10-15
Payer: COMMERCIAL

## 2024-10-15 VITALS
HEART RATE: 85 BPM | SYSTOLIC BLOOD PRESSURE: 172 MMHG | DIASTOLIC BLOOD PRESSURE: 92 MMHG | WEIGHT: 227.8 LBS | HEIGHT: 70 IN | BODY MASS INDEX: 32.61 KG/M2

## 2024-10-15 DIAGNOSIS — G20.A2 PARKINSON'S DISEASE WITHOUT DYSKINESIA, WITH FLUCTUATING MANIFESTATIONS: Primary | ICD-10-CM

## 2024-10-15 RX ORDER — CARBIDOPA AND LEVODOPA 25; 100 MG/1; MG/1
2.5 TABLET ORAL 4 TIMES DAILY
Qty: 300 TABLET | Refills: 5 | Status: SHIPPED | OUTPATIENT
Start: 2024-10-15 | End: 2024-11-14

## 2024-10-15 NOTE — PROGRESS NOTES
"Chief Complaint  Parkinson's Disease    Subjective          Lenny Sanders presents to [facility] NEUROLOGY & NEUROSURGERY  History of Present Illness    History of Present Illness  The patient presents for evaluation of Parkinson's disease. He is accompanied by an adult female.    He reports that his Parkinson's disease is generally well-managed, but he is experiencing wearing off of medication at times. He recalls that his right hand began shaking five or six years ago, and now his other hand has started to shake as well. He notes that his anxiety worsens when his medication wears off.    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 He did not require medication this past Sunday as he was able to rest. He mentions that he does not always need his medication immediately upon waking. He has been working overtime and has noticed that strenuous activities exacerbate his symptoms. He recalls an incident where he felt his movements slow down while on a ladder, prompting him to descend.    He recently experienced severe back pain radiating down his leg, which he initially thought was bone-related but now believes to be muscle spasms. This episode left him unable to walk, requiring the use of a walker. He took leftover cyclobenzaprine and methocarbamol, which provided some relief. He still feels the need to stretch upon waking. He has had similar episodes a few times a year for several years and has sought chiropractic treatment in the past.    He also reports neck issues, which he attributes to aging and riding a sports bike. He has lost weight and is considering surgery.       Objective   Vital Signs:   /92   Pulse 85   Ht 177.8 cm (70\")   Wt 103 kg (227 lb 12.8 oz)   BMI 32.69 kg/m²     Physical Exam  HENT:      Head: Normocephalic.   Pulmonary:      Effort: Pulmonary " effort is normal.   Neurological:      Mental Status: He is alert and oriented to person, place, and time.      Sensory: Sensation is intact.      Motor: Motor function is intact.      Coordination: Coordination is intact.      Deep Tendon Reflexes: Reflexes are normal and symmetric.        Neurological Exam  Mental Status  Alert. Oriented to person, place, and time.    Sensory  Normal sensation.    Reflexes  Deep tendon reflexes are 2+ and symmetric in all four extremities.    Coordination    Finger-to-nose, rapid alternating movements and heel-to-shin normal bilaterally without dysmetria.      Result Review :               Assessment and Plan    Diagnoses and all orders for this visit:    1. Parkinson's disease without dyskinesia, with fluctuating manifestations (Primary)    Other orders  -     carbidopa-levodopa (SINEMET)  MG per tablet; Take 2.5 tablets by mouth 4 (Four) Times a Day for 30 days.  Dispense: 300 tablet; Refill: 5        Assessment & Plan  1. Parkinson's Disease.  He is currently on the maximum dose of carbidopa. It is recommended to avoid adding any additional medications at this time. His symptoms of slowness, stiffness, and rigidity are indicative of parkinsonism. The potential benefits and limitations of deep brain stimulation were discussed, including its primary utility in managing tremor-dominant Parkinson's and its limited effect on imbalance and shuffling gait. He was advised to take his medication as needed rather than out of habit, which could help extend his on-periods. He was encouraged to engage in large movements such as walking and swinging his arms, which can be beneficial for brain function.  A new method of carbidopa delivery via a pump, which provides a slow steady release, was discussed as a future option once FDA-approved.    2. Sciatica.  He experienced a severe episode of back pain with muscle spasms radiating down his leg. He used leftover cyclobenzaprine and  methocarbamol, which provided some relief. It is likely that he inflamed his sciatic nerve. Since he is currently improving, no further intervention is necessary. If symptoms worsen, physical therapy will be required before considering lumbar spine imaging.           Follow Up   No follow-ups on file.  Patient was given instructions and counseling regarding his condition or for health maintenance advice. Please see specific information pulled into the AVS if appropriate.       Patient or patient representative verbalized consent for the use of Ambient Listening during the visit with  SHUKRI Aparicio for chart documentation. 10/18/2024  15:43 EDT

## 2024-11-15 ENCOUNTER — TELEPHONE (OUTPATIENT)
Dept: NEUROLOGY | Facility: CLINIC | Age: 59
End: 2024-11-15
Payer: COMMERCIAL

## 2024-12-17 ENCOUNTER — OFFICE VISIT (OUTPATIENT)
Dept: NEUROLOGY | Facility: CLINIC | Age: 59
End: 2024-12-17
Payer: COMMERCIAL

## 2024-12-17 VITALS
BODY MASS INDEX: 32.74 KG/M2 | DIASTOLIC BLOOD PRESSURE: 69 MMHG | HEIGHT: 70 IN | HEART RATE: 88 BPM | SYSTOLIC BLOOD PRESSURE: 127 MMHG | WEIGHT: 228.7 LBS

## 2024-12-17 DIAGNOSIS — G20.A2 PARKINSON'S DISEASE WITHOUT DYSKINESIA, WITH FLUCTUATING MANIFESTATIONS: Primary | ICD-10-CM

## 2024-12-18 NOTE — PROGRESS NOTES
"Chief Complaint  Parkinson's Disease    Subjective          Lenny Sanders presents to [facility] NEUROLOGY & NEUROSURGERY  History of Present Illness    History of Present Illness  The patient is a 59-year-old male who presents to the office for a follow-up for Parkinson's disease.    He reports an improvement in his condition, attributing it to a recent vacation from work, which has resulted in decreased activity levels. 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 He also mentions that he has been working more overtime this year than previously, leading to increased anxiety. Despite these challenges, he prefers to continue with his current treatment plan for as long as possible. He is considering the possibility of applying for FMLA. His current medication management strategy involves taking his prescribed medications approximately 2 to 3 hours after waking up, provided he is feeling well.    MEDICATIONS  Current: carbidopa       Objective   Vital Signs:   /69   Pulse 88   Ht 177.8 cm (70\")   Wt 104 kg (228 lb 11.2 oz)   BMI 32.82 kg/m²     Physical Exam  HENT:      Head: Normocephalic.   Pulmonary:      Effort: Pulmonary effort is normal.   Neurological:      Mental Status: He is alert and oriented to person, place, and time.      Sensory: Sensation is intact.      Motor: Motor function is intact.      Coordination: Coordination is intact.      Deep Tendon Reflexes: Reflexes are normal and symmetric.        Neurological Exam  Mental Status  Alert. Oriented to person, place, and time.    Sensory  Normal sensation.    Reflexes  Deep tendon reflexes are 2+ and symmetric in all four extremities.    Coordination    Finger-to-nose, rapid alternating movements and heel-to-shin normal bilaterally without dysmetria.      Result Review :               Assessment and " Plan    Diagnoses and all orders for this visit:    1. Parkinson's disease without dyskinesia, with fluctuating manifestations (Primary)        Assessment & Plan  1. Parkinson's disease.  He was presented with the option of utilizing a 24-hour pump for continuous medication delivery, which could potentially reduce his off periods. However, he expressed disinterest in this option at present, citing the size of the pump as a concern. He was advised to consider limiting his work hours to manage his symptoms better. He was also informed that he could apply for FMLA if necessary. The current dosage of carbidopa will be maintained.    Follow-up  The patient is scheduled for a follow-up visit in 6 months.       I spent 30 minutes caring for Lenny on this date of service. This time includes time spent by me in the following activities:preparing for the visit, obtaining and/or reviewing a separately obtained history, performing a medically appropriate examination and/or evaluation , counseling and educating the patient/family/caregiver, ordering medications, tests, or procedures, documenting information in the medical record, and independently interpreting results and communicating that information with the patient/family/caregiver  Follow Up   Return in about 6 months (around 6/17/2025) for parkinson's f/u.  Patient was given instructions and counseling regarding his condition or for health maintenance advice. Please see specific information pulled into the AVS if appropriate.       Patient or patient representative verbalized consent for the use of Ambient Listening during the visit with  SHUKRI Aparicio for chart documentation. 12/18/2024  15:44 EST          MOLECULAR PCR

## 2025-04-24 ENCOUNTER — TELEPHONE (OUTPATIENT)
Dept: NEUROLOGY | Facility: CLINIC | Age: 60
End: 2025-04-24
Payer: COMMERCIAL

## 2025-04-24 RX ORDER — CARBIDOPA AND LEVODOPA 25; 100 MG/1; MG/1
2.5 TABLET ORAL 4 TIMES DAILY
Qty: 300 TABLET | Refills: 0 | Status: SHIPPED | OUTPATIENT
Start: 2025-04-24 | End: 2025-05-24

## 2025-04-30 RX ORDER — CARBIDOPA AND LEVODOPA 25; 100 MG/1; MG/1
2.5 TABLET ORAL 4 TIMES DAILY
Qty: 900 TABLET | Refills: 0 | Status: SHIPPED | OUTPATIENT
Start: 2025-04-30 | End: 2025-05-30

## 2025-06-17 ENCOUNTER — OFFICE VISIT (OUTPATIENT)
Dept: NEUROLOGY | Facility: CLINIC | Age: 60
End: 2025-06-17
Payer: COMMERCIAL

## 2025-06-17 VITALS
WEIGHT: 223.6 LBS | HEIGHT: 70 IN | SYSTOLIC BLOOD PRESSURE: 146 MMHG | HEART RATE: 82 BPM | DIASTOLIC BLOOD PRESSURE: 95 MMHG | BODY MASS INDEX: 32.01 KG/M2

## 2025-06-17 DIAGNOSIS — G20.A2 PARKINSON'S DISEASE WITHOUT DYSKINESIA, WITH FLUCTUATING MANIFESTATIONS: Primary | ICD-10-CM

## 2025-06-17 RX ORDER — ENTACAPONE 200 MG/1
200 TABLET ORAL 3 TIMES DAILY
Qty: 180 TABLET | Refills: 1 | Status: SHIPPED | OUTPATIENT
Start: 2025-06-17

## 2025-06-17 RX ORDER — CARBIDOPA AND LEVODOPA 25; 100 MG/1; MG/1
2.5 TABLET ORAL 4 TIMES DAILY
Qty: 900 TABLET | Refills: 1 | Status: SHIPPED | OUTPATIENT
Start: 2025-06-17 | End: 2025-07-17

## 2025-06-17 NOTE — PROGRESS NOTES
"Chief Complaint  Parkinson's Disease    Subjective          Lenny Sanders presents to [facility]  History of Present Illness    History of Present Illness  The patient presents for a follow-up visit regarding Parkinson's disease management. He is currently taking 2.5 tablets of carbidopa-levodopa four times daily.    He reports that his condition remains stable overall, though the effectiveness of his medication varies with his activities. Strenuous activities, such as cutting trees, seem to reduce the duration of the medication's effect. He experiences stiffness during the off-time of his medication. Initially, the medication was highly effective, and he did not feel as though he had Parkinson's disease. His work schedule often results in a delay of up to 30 minutes between doses, which can be challenging. Stressful situations exacerbate his symptoms, as noted during a recent power outage at work. He has considered the use of a pump but is hesitant due to its size. He is seeking assistance with [employer] paperwork. He does not experience significant dyskinesia and can manage it without much difficulty. Occasionally, he reduces his dosage from four to three tablets on Saturdays due to his work schedule, which sometimes requires him to work 24 to 28 hours straight. He was previously prescribed medication for restless legs syndrome, but it did not provide any relief.    INTERVAL: Since the last visit, he reports that his condition has remained about the same.    MEDICATIONS  CURRENT MEDS:  Carbidopa Levodopa 2.5 tablets Oral 4 times daily         Objective   Vital Signs:   /95   Pulse 82   Ht 177.8 cm (70\")   Wt 101 kg (223 lb 9.6 oz)   BMI 32.08 kg/m²     Physical Exam  Neurological:      Mental Status: He is alert.      Coordination: Coordination is intact.      Deep Tendon Reflexes: Reflexes are normal and symmetric.        Neurological Exam  Mental " Status  Alert.    Reflexes  Deep tendon reflexes are 2+ and symmetric in all four extremities.    Coordination    Finger-to-nose, rapid alternating movements and heel-to-shin normal bilaterally without dysmetria.  Diffuse mild bradykinesia. Mild rigidity. .      Result Review :               Assessment and Plan    Diagnoses and all orders for this visit:    1. Parkinson's disease without dyskinesia, with fluctuating manifestations (Primary)    Other orders  -     carbidopa-levodopa (SINEMET)  MG per tablet; Take 2.5 tablets by mouth 4 (Four) Times a Day for 30 days.  Dispense: 900 tablet; Refill: 1  -     entacapone (COMTAN) 200 MG tablet; Take 1 tablet by mouth 3 (Three) Times a Day.  Dispense: 180 tablet; Refill: 1        Assessment & Plan  1. Parkinson's disease.  He reports that his current medication regimen of carbidopa-levodopa 2.5 tablets 4 times daily is not lasting as long as it used to, particularly during strenuous activities. He experiences stiffness and muscle tightness during off periods. The addition of entacapone 200 mg 3 times daily is recommended to prolong the effect of carbidopa-levodopa. If entacapone does not provide sufficient relief, other medications such as amantadine or dopamine agonists like ropinirole or pramipexole may be considered. The [facility] paperwork will be completed and faxed to his employer for job protection.    Follow-up  The patient will follow up in 4 months.         Follow Up   Return in about 4 months (around 10/17/2025) for parkinson's f/u.  Patient was given instructions and counseling regarding his condition or for health maintenance advice. Please see specific information pulled into the AVS if appropriate.       Patient or patient representative verbalized consent for the use of Ambient Listening during the visit with  SHUKRI Aparicio for chart documentation. 6/20/2025  15:07 EDT